# Patient Record
Sex: FEMALE | Race: WHITE
[De-identification: names, ages, dates, MRNs, and addresses within clinical notes are randomized per-mention and may not be internally consistent; named-entity substitution may affect disease eponyms.]

---

## 2021-06-07 ENCOUNTER — HOSPITAL ENCOUNTER (EMERGENCY)
Dept: HOSPITAL 47 - EC | Age: 51
Discharge: HOME | End: 2021-06-07
Payer: COMMERCIAL

## 2021-06-07 VITALS
TEMPERATURE: 98.4 F | RESPIRATION RATE: 18 BRPM | DIASTOLIC BLOOD PRESSURE: 84 MMHG | HEART RATE: 99 BPM | SYSTOLIC BLOOD PRESSURE: 146 MMHG

## 2021-06-07 DIAGNOSIS — K08.89: Primary | ICD-10-CM

## 2021-06-07 DIAGNOSIS — F17.200: ICD-10-CM

## 2021-06-07 DIAGNOSIS — R22.0: ICD-10-CM

## 2021-06-07 PROCEDURE — 99282 EMERGENCY DEPT VISIT SF MDM: CPT

## 2021-06-07 NOTE — ED
General Adult HPI





- General


Chief complaint: Skin/Abscess/Foreign Body


Stated complaint: dental pain


Time Seen by Provider: 21 08:58


Source: patient


Mode of arrival: ambulatory


Limitations: no limitations





- History of Present Illness


Initial comments: 





50-year-old female presents to the emergency department for a chief complaint of

dental pain.  Patient states this started yesterday.  States she has been taking

Motrin and Tylenol which does help. She has been icing and using heat on the 

area.  No fevers over patient has chills on and off.  States she thinks it is 

coming from her teeth.Patient has no other complaints at this time including 

shortness of breath, chest pain, abdominal pain, nausea or vomiting, headache, 

or visual changes.





- Related Data


                                  Previous Rx's











 Medication  Instructions  Recorded


 


Ibuprofen [Motrin] 600 mg PO Q6HR PRN #20 tab 04/11/15


 


Orphenadrine [Norflex] 100 mg PO Q12H #10 tablet.er 04/11/15


 


predniSONE 50 mg PO DAILY #5 tab 04/11/15


 


traMADol HCl [Ultram] 50 mg PO Q4H PRN #20 tab 04/11/15


 


Ibuprofen [Motrin] 600 mg PO Q6HR PRN #20 tab 21


 


Penicillin V Potassium [Pen Vee K] 500 mg PO Q6H 10 Days #40 tablet 21











                                    Allergies











Allergy/AdvReac Type Severity Reaction Status Date / Time


 


No Known Allergies Allergy   Verified 21 08:49














Review of Systems


ROS Statement: 


Those systems with pertinent positive or pertinent negative responses have been 

documented in the HPI.





ROS Other: All systems not noted in ROS Statement are negative.





Past Medical History


Past Medical History: No Reported History


History of Any Multi-Drug Resistant Organisms: None Reported


Past Surgical History:  Section


Past Psychological History: No Psychological Hx Reported


Smoking Status: Current every day smoker


Past Alcohol Use History: None Reported


Past Drug Use History: None Reported





General Exam


Limitations: no limitations


General appearance: alert


Head exam: Present: atraumatic, normocephalic, normal inspection


Eye exam: Present: normal appearance, PERRL, EOMI.  Absent: scleral icterus, 

conjunctival injection, periorbital swelling


ENT exam: Present: normal external ear exam, other (Patient does left lower 

anterior mandibular swelling.).  Absent: normal oropharynx (Tenderness noted to 

tooth 20 with palpation.  No evidence for abscess.)


Neck exam: Present: normal inspection, full ROM.  Absent: tenderness, 

meningismus, lymphadenopathy


Respiratory exam: Present: normal lung sounds bilaterally.  Absent: respiratory 

distress, wheezes, rales, rhonchi, stridor


Cardiovascular Exam: Present: regular rate, normal rhythm, normal heart sounds. 

Absent: systolic murmur, diastolic murmur, rubs, gallop, clicks


Neurological exam: Present: alert





Course


                                   Vital Signs











  21





  08:47


 


Temperature 98.4 F


 


Pulse Rate 99


 


Respiratory 18





Rate 


 


Blood Pressure 146/84


 


O2 Sat by Pulse 98





Oximetry 














Medical Decision Making





- Medical Decision Making





Patient presents for dental pain and does have some swelling left lower anterior

mandibular area.  No respiratory distress.  No drooling or trismus.  No abscess 

noted.  Patient will be treated with penicillin and Motrin.  Will follow up with

dentist, referral given.  He will return for any worsening symptoms.





Disposition


Clinical Impression: 


 Pain, dental





Disposition: HOME SELF-CARE


Condition: Good


Instructions (If sedation given, give patient instructions):  Dental Abscess 

(ED)


Additional Instructions: 


Take antibiotic as directed.  Alternate Motrin and Tylenol for pain every 3 

hours.  Follow-up with the dentist.  Return to the emergency room for any 

worsening symptoms.





[facility]





[address] [place] [address] [id number] [phone] (existing clients only)





New clients: [phone]





1st consult: $50 (includes XRs)





Usually 30% less than private dentist for visits after.





[facility]





Have to pay $50 for Xrays and rest is covered





[phone]


Prescriptions: 


Ibuprofen [Motrin] 600 mg PO Q6HR PRN #20 tab


 PRN Reason: Pain


Penicillin V Potassium [Pen Vee K] 500 mg PO Q6H 10 Days #40 tablet


Is patient prescribed a controlled substance at d/c from ED?: No


Referrals: 


Lexie Joyce MD [REFERRING] - 1-2 days


Time of Disposition: 09:17